# Patient Record
Sex: MALE | Race: WHITE | NOT HISPANIC OR LATINO | URBAN - METROPOLITAN AREA
[De-identification: names, ages, dates, MRNs, and addresses within clinical notes are randomized per-mention and may not be internally consistent; named-entity substitution may affect disease eponyms.]

---

## 2022-03-11 ENCOUNTER — EMERGENCY (EMERGENCY)
Facility: HOSPITAL | Age: 32
LOS: 0 days | Discharge: HOME | End: 2022-03-11
Attending: STUDENT IN AN ORGANIZED HEALTH CARE EDUCATION/TRAINING PROGRAM | Admitting: STUDENT IN AN ORGANIZED HEALTH CARE EDUCATION/TRAINING PROGRAM
Payer: MEDICARE

## 2022-03-11 VITALS
TEMPERATURE: 97 F | HEART RATE: 72 BPM | RESPIRATION RATE: 18 BRPM | HEIGHT: 70 IN | SYSTOLIC BLOOD PRESSURE: 131 MMHG | WEIGHT: 199.96 LBS | OXYGEN SATURATION: 97 % | DIASTOLIC BLOOD PRESSURE: 63 MMHG

## 2022-03-11 DIAGNOSIS — Z23 ENCOUNTER FOR IMMUNIZATION: ICD-10-CM
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
## 2022-03-11 DIAGNOSIS — S01.01XA LACERATION WITHOUT FOREIGN BODY OF SCALP, INITIAL ENCOUNTER: ICD-10-CM
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
## 2022-03-11 DIAGNOSIS — Z91.048 OTHER NONMEDICINAL SUBSTANCE ALLERGY STATUS: ICD-10-CM
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
## 2022-03-11 PROCEDURE — 99283 EMERGENCY DEPT VISIT LOW MDM: CPT | Mod: 25

## 2022-03-11 PROCEDURE — 12001 RPR S/N/AX/GEN/TRNK 2.5CM/<: CPT

## 2022-03-11 RX ORDER — TETANUS TOXOID, REDUCED DIPHTHERIA TOXOID AND ACELLULAR PERTUSSIS VACCINE, ADSORBED 5; 2.5; 8; 8; 2.5 [IU]/.5ML; [IU]/.5ML; UG/.5ML; UG/.5ML; UG/.5ML
0.5 SUSPENSION INTRAMUSCULAR ONCE
Refills: 0 | Status: COMPLETED | OUTPATIENT
Start: 2022-03-11 | End: 2022-03-11

## 2022-03-11 RX ADMIN — TETANUS TOXOID, REDUCED DIPHTHERIA TOXOID AND ACELLULAR PERTUSSIS VACCINE, ADSORBED 0.5 MILLILITER(S): 5; 2.5; 8; 8; 2.5 SUSPENSION INTRAMUSCULAR at 23:05

## 2022-03-11 NOTE — ED PROVIDER NOTE - ATTENDING CONTRIBUTION TO CARE
31 yo M p/w laceration to the posterior occiput. Patient was exercising, resistance band snapped and the metal end lacerated the back of his scalp. Unknown last tdap. No LOC.     Plan for tdap, lac repair.

## 2022-03-11 NOTE — ED PROVIDER NOTE - NS ED ROS FT
Review of Systems:  	•	CONSTITUTIONAL - no fever, no diaphoresis, no chills  	•	SKIN - + laceration   	•	HEMATOLOGIC - no bleeding, no bruising  	•	MUSCULOSKELETAL - no joint paint, no swelling, no redness  	•	NEUROLOGIC - no weakness, no paresthesias

## 2022-03-11 NOTE — ED ADULT TRIAGE NOTE - CHIEF COMPLAINT QUOTE
" I was working out and I hit the back of my head with the resistance band handle and I have a cut."" " I was working out and I hit the back of my head with the resistance band  and I have a cut.""

## 2022-03-11 NOTE — ED PROVIDER NOTE - PHYSICAL EXAMINATION
GEN: Patient in no acute distress  MS: Normal ROM in all extremities. No midline spinal tenderness. pulses 2 +  SKIN: 1 cm laceration to occipital scalp, Warm, dry, no acute rashes. Good turgor  NEURO: Strength 5/5 with no sensory deficits. Steady gait.

## 2022-03-11 NOTE — ED ADULT NURSE NOTE - NSIMPLEMENTINTERV_GEN_ALL_ED
Implemented All Universal Safety Interventions:  [place] to call system. Call bell, personal items and telephone within reach. Instruct patient to call for assistance. Room bathroom lighting operational. Non-slip footwear when patient is off stretcher. Physically safe environment: no spills, clutter or unnecessary equipment. Stretcher in lowest position, wheels locked, appropriate side rails in place.

## 2022-03-11 NOTE — ED ADULT NURSE NOTE - CAS ELECT INFOMATION PROVIDED
Called and spoke to patient. Informed of normal results. Pt verbalized and expressed understanding.
Called pt and LMOVM to call us back for test results. No name set up on .
DC instructions

## 2022-03-11 NOTE — ED PROVIDER NOTE - PATIENT PORTAL LINK FT
You can access the FollowMyHealth Patient Portal offered by [facility] by registering at the following website: [email or web address]. By joining [employer]’s FollowMyHealth portal, you will also be able to view your health information using other applications (apps) compatible with our system.

## 2022-03-18 ENCOUNTER — EMERGENCY (EMERGENCY)
Facility: HOSPITAL | Age: 32
LOS: 0 days | Discharge: HOME | End: 2022-03-18
Attending: EMERGENCY MEDICINE | Admitting: EMERGENCY MEDICINE
Payer: MEDICARE

## 2022-03-18 VITALS
TEMPERATURE: 97 F | RESPIRATION RATE: 16 BRPM | DIASTOLIC BLOOD PRESSURE: 58 MMHG | HEART RATE: 72 BPM | SYSTOLIC BLOOD PRESSURE: 138 MMHG | OXYGEN SATURATION: 99 % | HEIGHT: 70 IN
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
## 2022-03-18 DIAGNOSIS — S01.01XD LACERATION WITHOUT FOREIGN BODY OF SCALP, SUBSEQUENT ENCOUNTER: ICD-10-CM

## 2022-03-18 PROCEDURE — L9995: CPT

## 2022-03-18 NOTE — ED PROVIDER NOTE - NS ED ROS FT
Review of Systems:  CONSTITUTIONAL: No fever   SKIN: No rash  EYES: No blurred vision  RESPIRATORY: No shortness of breath   CARDIAC: No chest pain   GI: No abdominal pain, No nausea, No vomiting   NEUROLOGIC: No numbness, No focal weakness, No headache  All other systems negative, unless specified in HPI

## 2022-03-18 NOTE — ED PROVIDER NOTE - PATIENT PORTAL LINK FT
You can access the FollowMyHealth Patient Portal offered by [facility] by registering at the following website: [email or web address]. By joining [employer]’s FollowMyHealth portal, you will also be able to view your health information using other applications (apps) compatible with our system.

## 2022-03-18 NOTE — ED PROVIDER NOTE - ATTENDING CONTRIBUTION TO CARE
pt for staple removal no other complaints; pt feeling well. wound healed without complication. stable at dc.

## 2022-03-18 NOTE — ED PROVIDER NOTE - OBJECTIVE STATEMENT
31 y/o M no PMHx presents to ED for staple removal. Pt seen in ED on 3/18 for laceration to posterior scalp from working out, resistance band snapped and hit back of his head. No LOC. Denies vomiting, fevers.

## 2022-03-18 NOTE — ED PROVIDER NOTE - PHYSICAL EXAMINATION
CONSTITUTIONAL: Well-developed; well-nourished; in no acute distress.   SKIN: warm, dry  HEAD: Normocephalic; +2 staples in place to posterior scalp, well healing no erythema, no crepitus, no drainage   EYES: no conjunctival injection. EOMI.   ENT: No nasal discharge; airway clear.  NECK: Supple   CARD: RRR.   RESP: No increased WOB.  ABD: soft ntnd.   EXT: Distal pulses intact.   NEURO: Alert, oriented, grossly unremarkable.  PSYCH: Cooperative

## 2022-03-18 NOTE — ED PROVIDER NOTE - PROGRESS NOTE DETAILS
Authored by Neelam Ferguson DO: 2 Staples removed. Patient to be discharged from ED. Any available test results were discussed with patient and/or family. Verbal instructions given, including instructions to return to ED immediately for any new, worsening, or concerning symptoms. Patient endorsed understanding. Written discharge instructions additionally given, including follow-up plan.

## 2022-03-18 NOTE — ED PROVIDER NOTE - NSFOLLOWUPCLINICS_GEN_ALL_ED_FT
[facility]  Medicine  [address], [place]   Phone: [phone]  Fax:   Follow Up Time: Routine

## 2023-09-28 NOTE — ED PROVIDER NOTE - TOBACCO USE
Goal Outcome Evaluation:       Patient alert and oriented able to make his needs known, continent of B/B. Assist of 1 with walker.Patient doing good denies pain vitals within normal limits no concerns will continue with current care plan.                  Never smoker